# Patient Record
Sex: MALE | Race: WHITE | Employment: FULL TIME | URBAN - METROPOLITAN AREA
[De-identification: names, ages, dates, MRNs, and addresses within clinical notes are randomized per-mention and may not be internally consistent; named-entity substitution may affect disease eponyms.]

---

## 2017-04-17 ENCOUNTER — HOSPITAL ENCOUNTER (OUTPATIENT)
Dept: CT IMAGING | Age: 62
Discharge: HOME | End: 2017-04-17
Attending: INTERNAL MEDICINE
Payer: SELF-PAY

## 2017-04-17 DIAGNOSIS — Z13.6 ENCOUNTER FOR SCREENING FOR CARDIOVASCULAR DISORDERS: ICD-10-CM

## 2017-04-17 PROCEDURE — 75571 CT HRT W/O DYE W/CA TEST: CPT

## 2017-04-19 ENCOUNTER — TELEPHONE (OUTPATIENT)
Dept: CARDIAC REHAB | Age: 62
End: 2017-04-19

## 2017-04-19 NOTE — TELEPHONE ENCOUNTER
Called patient to discuss his CT scan results. Verified hisdate of birth and then reviewed the patient results. Hiscalcium score is 411. This corresponds to a significant amount of plaque noted in the coronary arteries. Greg Sharmaey a significantly greater risk of having a heart attack. It is reccommended that hefollow up with their PCP to make sure that any and all risk factors are being optimally managed and then the patient will most likely be referred to a cardiologist for further testing and treatments. Will fax copy of the report to his PCP. Patient verbalized understanding of the results.